# Patient Record
Sex: MALE | Race: WHITE | Employment: OTHER | RURAL
[De-identification: names, ages, dates, MRNs, and addresses within clinical notes are randomized per-mention and may not be internally consistent; named-entity substitution may affect disease eponyms.]

---

## 2021-09-30 ENCOUNTER — HOSPITAL ENCOUNTER (EMERGENCY)
Facility: HOSPITAL | Age: 76
Discharge: HOME | End: 2021-09-30
Attending: HOSPITALIST
Payer: MEDICARE

## 2021-09-30 VITALS
WEIGHT: 200 LBS | HEART RATE: 63 BPM | OXYGEN SATURATION: 94 % | BODY MASS INDEX: 32.14 KG/M2 | SYSTOLIC BLOOD PRESSURE: 140 MMHG | TEMPERATURE: 97.6 F | RESPIRATION RATE: 22 BRPM | HEIGHT: 66 IN | DIASTOLIC BLOOD PRESSURE: 87 MMHG

## 2021-09-30 DIAGNOSIS — Z11.52 ENCOUNTER FOR SCREENING FOR COVID-19: Primary | ICD-10-CM

## 2021-09-30 LAB — SARS-COV-2, NAAT: NOT DETECTED

## 2021-09-30 PROCEDURE — 99283 EMERGENCY DEPT VISIT LOW MDM: CPT

## 2021-09-30 PROCEDURE — 87635 SARS-COV-2 COVID-19 AMP PRB: CPT

## 2021-09-30 NOTE — ED NOTES
Rapid COVID swab collected     Delilah Brower, [facility]  09/30/21 2039

## 2021-09-30 NOTE — ED PROVIDER NOTES
[address] ENCOUNTER      Pt Name: Moiz Carreon  MRN: [id number]  YOB: 1945  Date of evaluation: 9/30/2021  Provider: Angeles Bloom DO    CHIEF COMPLAINT       Chief Complaint   Patient presents with    Covid Testing     pt's brother and nephew have been diagnosed- pt unsure when and unsure of exposure- pt states no symptoms- states his breathing is not different than normal         HISTORY OF PRESENT ILLNESS  (Location/Symptom, Timing/Onset, Context/Setting, Quality, Duration, Modifying Factors, Severity.)   Moiz Carreon is a 78 y.o. male who presents to the emergency department for concern for COVID-19. Patient states that he has no symptoms but he just wanted to get checked for COVID-19. He has a piece of paper with him that one of his family members has written for him but states that he is brother and nephew has been tested positive for Covid. He is unaware of what day they tested positive or when his last exposure to them was but the patient himself denies any complaints. He does have a chronic wheeze or almost stridor like a respiratory effort which he states is like that all the time. Patient states he has had that his entire life. He is not on supplemental oxygen at home. He denies any fevers or chills. Denies any nausea vomiting or diarrhea. Denies any cough out of ordinary. Denies any shortness of breath. Denies any chest pain. Patient states he is just here to get checked for COVID-19. Patient is vaccinated for COVID-19. States he got the 2 shot injection but unaware of which one. States that it has been greater than 2 weeks since his second shot. Nursing notes were reviewed.     REVIEW OFSYSTEMS    (2-9 systems for level 4, 10 or more for level 5)   ROS:  General:  No fevers, no chills, no weakness  Cardiovascular:  No chest pain, no palpitations  Respiratory:  No shortness of breath, no cough, no wheezing  Gastrointestinal:  No pain, no nausea, no vomiting, no diarrhea  Musculoskeletal:  No muscle pain, no joint pain  Skin:  No rash, no easy bruising  Neurologic:  No speech problems, no headache, no extremity weakness  Psychiatric:  No anxiety  Genitourinary:  No dysuria, no hematuria    Except as noted above the remainder of the review of systems was reviewed and negative. PAST MEDICAL HISTORY     Past Medical History:   Diagnosis Date    Asthma     Seizures ([facility] [address]          SURGICAL HISTORY     History reviewed. No pertinent surgical history. CURRENT MEDICATIONS       Previous Medications    No medications on file       ALLERGIES     Patient has no known allergies. FAMILY HISTORY     History reviewed. No pertinent family history. SOCIAL HISTORY       Social History     Socioeconomic History    Marital status: None     Spouse name: None    Number of children: None    Years of education: None    Highest education level: None   Occupational History    None   Tobacco Use    Smoking status: Never Smoker    Smokeless tobacco: Never Used   Substance and Sexual Activity    Alcohol use: Not Currently    Drug use: Never    Sexual activity: None   Other Topics Concern    None   Social History Narrative    None     Social Determinants of Health     Financial Resource Strain:     Difficulty of Paying Living Expenses:    Food Insecurity:     Worried About Running Out of Food in the Last Year:     Ran Out of Food in the Last Year:    Transportation Needs:     Lack of Transportation (Medical):      Lack of Transportation (Non-Medical):    Physical Activity:     Days of Exercise per Week:     Minutes of Exercise per Session:    Stress:     Feeling of Stress :    Social Connections:     Frequency of Communication with Friends and Family:     Frequency of Social Gatherings with Friends and Family:     Attends Jew Services:     Active Member of Clubs or Organizations:     Attends Club or Organization Meetings:     Marital Status:    Intimate Partner Violence:     Fear of Current or Ex-Partner:     Emotionally Abused:     Physically Abused:     Sexually Abused:          PHYSICAL EXAM    (up to 7 for level 4, 8 or more for level 5)     ED Triage Vitals   BP Temp Temp src Pulse Resp SpO2 Height Weight   -- -- -- -- -- -- -- --       Physical Exam  General :Patient is awake, alert, oriented, in no acute distress, nontoxic appearing  HEENT: Pupils are equally round and reactive to light, EOMI, conjunctivae clear. Oral mucosa is moist, no exudate. Uvula is midline  Cardiac: Heart regular rate, rhythm, no murmurs, rubs, or gallops  Lungs: Lungs are clear to auscultation, there is no wheezing, rhonchi, or rales. There is no use of accessory muscles. There is a stridor-like sound that can be heard from the neck but does not correlate or translate into the lungs. Abdomen: Abdomen is soft, nontender, nondistended. There is no firm or pulsatile masses, no rebound rigidity or guarding. Musculoskeletal: 5 out of 5 strength in all 4 extremities. No focal muscle deficits are appreciated  Neuro: Motor intact, sensory intact, level of consciousness is normal  Dermatology: Skin is warm and dry  Psych: Mentation is grossly normal, cognition is grossly normal. Affect is appropriate.       DIAGNOSTIC RESULTS     EKG: All EKG's are interpreted by the Emergency Department Physician who either signs or Co-signs this chart in the [address] a cardiologist.        RADIOLOGY:   Non-plain film images such as CT, Ultrasound and MRI are read by the radiologist. Plain radiographic images are visualized and preliminarily interpreted by the emergency physician with the below findings:      ? Radiologist's Report Reviewed:  No orders to display         ED BEDSIDE ULTRASOUND:   Performed by ED Physician - none    LABS:    I have reviewed and interpreted all of the currently available lab results from this visit (ifapplicable):  Results for orders placed or performed during the hospital encounter of 09/30/21   COVID-19, Rapid    Specimen: Nasopharyngeal Swab   Result Value Ref Range    SARS-CoV-2, NAAT Not Detected Not Detected        All other labs were within normal range or not returned as of this dictation. EMERGENCY DEPARTMENT COURSE and DIFFERENTIAL DIAGNOSIS/MDM:   Vitals:    Vitals:    09/30/21 1156   BP: (!) 156/91   Pulse: 63   Resp: 22   Temp: 97.6 °F (36.4 °C)   SpO2: 97%   Weight: 200 lb (90.7 kg)   Height: 5' 6\" (1.676 m)       MEDICATIONS ADMINISTERED IN ED:  Medications - No data to display    After initial evaluation examination I did have a conversation with the patient about the upcoming plan, treatment possible disposition which they were agreeable to the times dictation. Patient advised we did perform the rapid Covid screen on him today. Unfortunately he does not know when his family members tested positive or when his last exposure to them actually was. But he is completely asymptomatic today apparently his family was concerned and they sent him in with a note to have a rapid Covid screen performed. Patient's resting come from stretcher no acute distress. Patient is nontoxic. Final disposition be determined once his Covid swab returns. Rapid Covid screen was negative    Patient's diagnostic studies were discussed with him and he does state his understanding. Patient advised that his a Covid screen was negative he is asymptomatic and does not even know when his exposure was but he does have a positive family members for COVID-19 but is unaware when they tested positive. Otherwise the patient will be discharged home in stable condition. The patient advised that he does need to follow-up with his regular family physician within the next 1 to 2 days reevaluation.   Patient was also given instructions if symptoms worsens or new symptoms arise he should return back to the emergency

## 2021-09-30 NOTE — ED NOTES
Pt presents to ER for COVID test- pt has a note with him that states Meredith Dhillon needs COVID test- brother and nephew both positive- pt unsure when they tested positive or last time he was around them- pt has audible wheezing noted when walking to room- pt tells physician that is not new- states he has battled with asthma since he was little boy- denies any other symptoms      Carol Galeana RN  09/30/21 [id number]

## 2021-11-20 ENCOUNTER — HOSPITAL ENCOUNTER (EMERGENCY)
Facility: HOSPITAL | Age: 76
Discharge: HOME | End: 2021-11-20
Attending: HOSPITALIST
Payer: MEDICARE

## 2021-11-20 VITALS
WEIGHT: 190 LBS | HEIGHT: 66 IN | HEART RATE: 70 BPM | BODY MASS INDEX: 30.53 KG/M2 | OXYGEN SATURATION: 93 % | SYSTOLIC BLOOD PRESSURE: 132 MMHG | DIASTOLIC BLOOD PRESSURE: 62 MMHG | RESPIRATION RATE: 24 BRPM | TEMPERATURE: 98.3 F

## 2021-11-20 DIAGNOSIS — H57.12 DISCOMFORT OF LEFT EYE: Primary | ICD-10-CM

## 2021-11-20 PROCEDURE — 99283 EMERGENCY DEPT VISIT LOW MDM: CPT

## 2021-11-20 PROCEDURE — 6370000000 HC RX 637 (ALT 250 FOR IP): Performed by: HOSPITALIST

## 2021-11-20 RX ORDER — TETRAHYDROZOLINE HCL 0.05 %
1 DROPS OPHTHALMIC (EYE) 3 TIMES DAILY
Qty: 10 ML | Refills: 4 | Status: SHIPPED | OUTPATIENT
Start: 2021-11-20

## 2021-11-20 RX ORDER — TETRACAINE HYDROCHLORIDE 5 MG/ML
1 SOLUTION OPHTHALMIC ONCE
Status: COMPLETED | OUTPATIENT
Start: 2021-11-20 | End: 2021-11-20

## 2021-11-20 RX ADMIN — FLUORESCEIN SODIUM 1 MG: 1 STRIP OPHTHALMIC at 13:19

## 2021-11-20 RX ADMIN — TETRACAINE HYDROCHLORIDE 1 DROP: 5 SOLUTION OPHTHALMIC at 13:19

## 2021-11-20 NOTE — ED PROVIDER NOTES
[address] [facility] ENCOUNTER      Pt Name: Chao Bae  MRN: [id number]  YOB: 1945  Date of evaluation: 11/20/2021  Provider: Chilo Adame, [address] [place] [address]       Chief Complaint   Patient presents with    Eye Problem         HISTORY OF PRESENT ILLNESS  (Location/Symptom, Timing/Onset, Context/Setting, Quality, Duration, Modifying Factors, Severity.)   Chao Bae is a 78 y.o. male who presents to the emergency department for eye problems. Patient states he has been having some sensation like there is something in his left eye for quite some time. He states it has been that way for about a year he also has intermittent symptoms to the right eye also. States has been using some over-the-counter eyedrops which does seem to help the symptoms at times. States he was seen by his primary care provider she came out to his house to evaluate him and she looked in his eye also but told him that there was nothing there that she could find. Patient come to the emergency department for evaluation today because of some left eye discomfort. Again he states it feels like there may be something in the eye. Patient is very hard of hearing is difficult to get information from but states it has been quite sometime since he is ever seen an eye doctor or had an eye exam performed. Denies any headaches with the symptoms. Denies any visual changes. Denies any pain with movement of the eyes. Denies any redness out of ordinary. States his lower eyelids have been droopy for quite some time. Nursing notes were reviewed.     REVIEW OFSYSTEMS    (2-9 systems for level 4, 10 or more for level 5)   ROS:  General:  No fevers, no chills, no weakness  Cardiovascular:  No chest pain, no palpitations  Respiratory:  No shortness of breath, no cough, no wheezing  Gastrointestinal:  No pain, no nausea, no vomiting, no diarrhea  Musculoskeletal:  No muscle pain, no joint pain  Skin:  No rash, no easy bruising  Neurologic:  No speech problems, no headache, no extremity weakness  Psychiatric:  No anxiety  Genitourinary:  No dysuria, no hematuria  EYES: +left eye discomfort    Except as noted above the remainder of the review of systems was reviewed and negative. PAST MEDICAL HISTORY     Past Medical History:   Diagnosis Date    Asthma     Seizures ([facility] [address]          SURGICAL HISTORY     History reviewed. No pertinent surgical history. CURRENT MEDICATIONS       Previous Medications    No medications on file       ALLERGIES     Patient has no known allergies. FAMILY HISTORY     History reviewed. No pertinent family history. SOCIAL HISTORY       Social History     Socioeconomic History    Marital status: Unknown     Spouse name: None    Number of children: None    Years of education: None    Highest education level: None   Occupational History    None   Tobacco Use    Smoking status: Never Smoker    Smokeless tobacco: Never Used   Substance and Sexual Activity    Alcohol use: Not Currently    Drug use: Never    Sexual activity: None   Other Topics Concern    None   Social History Narrative    None     Social Determinants of Health     Financial Resource Strain:     Difficulty of Paying Living Expenses: Not on file   Food Insecurity:     Worried About Running Out of Food in the Last Year: Not on file    [place] of Food in the Last Year: Not on file   Transportation Needs:     Lack of Transportation (Medical): Not on file    Lack of Transportation (Non-Medical):  Not on file   Physical Activity:     Days of Exercise per Week: Not on file    Minutes of Exercise per Session: Not on file   Stress:     Feeling of Stress : Not on file   Social Connections:     Frequency of Communication with Friends and Family: Not on file    Frequency of Social Gatherings with Friends and Family: Not on file    Attends Christian Services: Not on file   [employer] of Clubs or Organizations: Not on file    Attends Club or Organization Meetings: Not on file    Marital Status: Not on file   Intimate Partner Violence:     Fear of Current or Ex-Partner: Not on file    Emotionally Abused: Not on file    Physically Abused: Not on file    Sexually Abused: Not on file   Housing Stability:     Unable to Pay for Housing in the Last Year: Not on file    Number of [place] in the Last Year: Not on file    Unstable Housing in the Last Year: Not on file         PHYSICAL EXAM    (up to 7 for level 4, 8 or more for level 5)     ED Triage Vitals [11/20/21 1248]   BP Temp Temp Source Pulse Resp SpO2 Height Weight   132/62 98.3 °F (36.8 °C) Oral 70 24 94 % 5' 6\" (1.676 m) 190 lb (86.2 kg)       Physical Exam  General :Patient is awake, alert, oriented, in no acute distress, nontoxic appearing  HEENT: Pupils are equally round and reactive to light, EOMI, conjunctivae clear. Oral mucosa is moist, no exudate. Uvula is midline, patient is extremely hard of hearing. No visual abnormally to either eye except for the mild droopy eyelids which I do believe may be contributing to some dry eye. Patient had the left eye anesthetized with tetracaine and fluorescein strip was used with black light to there is no obvious corneal abrasion noted. Abdomen: Abdomen is soft, nontender, nondistended. There is no firm or pulsatile masses, no rebound rigidity or guarding. Psych: Mentation is grossly normal, cognition is grossly normal. Affect is appropriate.       DIAGNOSTIC RESULTS     EKG: All EKG's are interpreted by the Emergency Department Physician who either signs or Co-signs this chart in the [address] a cardiologist.        RADIOLOGY:   Non-plain film images such as CT, Ultrasound and MRI are read by the radiologist. Plain radiographic images are visualized and preliminarily interpreted by the emergency physician with the below findings:      ? Radiologist's Report Reviewed:  No orders to display         ED BEDSIDE ULTRASOUND:   Performed by ED Physician - none    LABS:    I have reviewed and interpreted all of the currently available lab results from this visit (ifapplicable):  No results found for this visit on 11/20/21. All other labs were within normal range or not returned as of this dictation. EMERGENCY DEPARTMENT COURSE and DIFFERENTIAL DIAGNOSIS/MDM:   Vitals:    Vitals:    11/20/21 1248   BP: 132/62   Pulse: 70   Resp: 24   Temp: 98.3 °F (36.8 °C)   TempSrc: Oral   SpO2: 94%   Weight: 190 lb (86.2 kg)   Height: 5' 6\" (1.676 m)       MEDICATIONS ADMINISTERED IN ED:  Medications   tetracaine (TETRAVISC) 0.5 % ophthalmic solution 1 drop (1 drop Left Eye Given 11/20/21 1319)   fluorescein ophthalmic strip 1 mg (1 mg Left Eye Given 11/20/21 1319)       Initial evaluation examination I did have a conversation with the patient about the upcoming plan, treatment possible disposition which they were agreeable to the times dictation. Advised that we would use a some tetracaine and fluorescein strip on his eye to make sure there is no acute corneal abrasion however he has had this discomfort going on for over a year and has not followed with a ophthalmologist.  Patient states he actually lives in [place] but prefers to come here for his care. Patient advised after wood lamp examination there is no obvious abrasion noted. Does not really need an antibiotic. I believe his biggest problem is because of the drooping lower eyelids there is some very mild ecchymosis noted and a cool compress to the area may help. Also advised that eyedrops to keep his eyes moisturized with probably help him quite a bit but he does need to follow-up with an ophthalmologist.  Patient does states his understanding this. We will provide him information for a ophthalmologist is local to him at home otherwise the patient be discharged home.   We will write him a prescription for some clear tear or Visine type eyedrops to use. The patient advised that he does need to follow-up with his regular family physician within the next 1 to 2 days for evaluation. Also given instructions if symptoms worsens or new symptoms arise he should return back to emergency department for further evaluation work-up. CONSULTS:  None    PROCEDURES:  Procedures    CRITICAL CARE TIME    Total Critical Care time was 0 minutes, excluding separately reportable procedures. There was a high probability of clinically significant/life threatening deterioration in the patient's condition which required my urgent intervention. FINAL IMPRESSION      1. Discomfort of left eye          DISPOSITION/PLAN   DISPOSITION        PATIENT REFERRED TO:  Your PCP  1-2 days for re-evaluation        [facility] Emergency Department  [address] [facility] [address] [address]  [facility]on[facility], [place] [id number] [phone]    Schedule an appointment as soon as possible for a visit in 1 week        DISCHARGE MEDICATIONS:  New Prescriptions    TETRAHYDROZOLINE (VISINE) 0.05 % OPHTHALMIC SOLUTION    Place 1 drop into both eyes 3 times daily       Comment: Please note this report has been produced using speech recognition software and may contain errorsrelated to that system including errors in grammar, punctuation, and spelling, as well as words and phrases that may be inappropriate. If there are any questions or concerns please feel free to contact the dictating providerfor clarification.     Markie Mauro DO  Attending Emergency Physician              Markie Mauro DO  11/20/21 [id number]

## 2021-11-20 NOTE — ED NOTES
Bed: EX5  Expected date: 11/20/21  Expected time: 12:40 PM  Means of arrival:   Comments:  Special Clean @ 12:40     Cloteal Mins, DAVID  11/20/21 9607